# Patient Record
Sex: FEMALE | Race: WHITE | URBAN - METROPOLITAN AREA
[De-identification: names, ages, dates, MRNs, and addresses within clinical notes are randomized per-mention and may not be internally consistent; named-entity substitution may affect disease eponyms.]

---

## 2017-02-14 ENCOUNTER — IMPORTED ENCOUNTER (OUTPATIENT)
Dept: URBAN - METROPOLITAN AREA CLINIC 50 | Facility: CLINIC | Age: 67
End: 2017-02-14

## 2017-09-21 NOTE — PATIENT DISCUSSION
(H25.13) Age-related nuclear cataract, bilateral - Assesment : Examination revealed cataract. Mild. - Plan : Monitor for changes. Advised patient of condition of cataracts. Pt to call our office with decreased vision or increased symptoms. Updated GLRx given today. RTC in 1 year for Exam, sooner if problems or changes occur.

## 2017-09-21 NOTE — PATIENT DISCUSSION
(H55.09) Other forms of nystagmus - Assesment : Examination revealed complaints of mild occasional episodes of nystagmus. Not present at exam today. Pt reports has drastically improved since stopping one of her Parkinson's medications. - Plan : Monitor for changes. Call if worsens.

## 2017-09-26 ENCOUNTER — IMPORTED ENCOUNTER (OUTPATIENT)
Dept: URBAN - METROPOLITAN AREA CLINIC 50 | Facility: CLINIC | Age: 67
End: 2017-09-26

## 2017-11-08 ENCOUNTER — IMPORTED ENCOUNTER (OUTPATIENT)
Dept: URBAN - METROPOLITAN AREA CLINIC 50 | Facility: CLINIC | Age: 67
End: 2017-11-08

## 2017-11-09 ENCOUNTER — IMPORTED ENCOUNTER (OUTPATIENT)
Dept: URBAN - METROPOLITAN AREA CLINIC 50 | Facility: CLINIC | Age: 67
End: 2017-11-09

## 2017-11-13 ENCOUNTER — IMPORTED ENCOUNTER (OUTPATIENT)
Dept: URBAN - METROPOLITAN AREA CLINIC 50 | Facility: CLINIC | Age: 67
End: 2017-11-13

## 2017-12-11 ENCOUNTER — IMPORTED ENCOUNTER (OUTPATIENT)
Dept: URBAN - METROPOLITAN AREA CLINIC 50 | Facility: CLINIC | Age: 67
End: 2017-12-11

## 2018-03-02 ENCOUNTER — IMPORTED ENCOUNTER (OUTPATIENT)
Dept: URBAN - METROPOLITAN AREA CLINIC 50 | Facility: CLINIC | Age: 68
End: 2018-03-02

## 2018-04-10 ENCOUNTER — IMPORTED ENCOUNTER (OUTPATIENT)
Dept: URBAN - METROPOLITAN AREA CLINIC 50 | Facility: CLINIC | Age: 68
End: 2018-04-10

## 2018-04-27 ENCOUNTER — IMPORTED ENCOUNTER (OUTPATIENT)
Dept: URBAN - METROPOLITAN AREA CLINIC 50 | Facility: CLINIC | Age: 68
End: 2018-04-27

## 2018-05-18 ENCOUNTER — IMPORTED ENCOUNTER (OUTPATIENT)
Dept: URBAN - METROPOLITAN AREA CLINIC 50 | Facility: CLINIC | Age: 68
End: 2018-05-18

## 2018-10-19 ENCOUNTER — IMPORTED ENCOUNTER (OUTPATIENT)
Dept: URBAN - METROPOLITAN AREA CLINIC 50 | Facility: CLINIC | Age: 68
End: 2018-10-19

## 2018-10-22 ENCOUNTER — IMPORTED ENCOUNTER (OUTPATIENT)
Dept: URBAN - METROPOLITAN AREA CLINIC 50 | Facility: CLINIC | Age: 68
End: 2018-10-22

## 2018-10-23 ENCOUNTER — IMPORTED ENCOUNTER (OUTPATIENT)
Dept: URBAN - METROPOLITAN AREA CLINIC 50 | Facility: CLINIC | Age: 68
End: 2018-10-23

## 2019-05-09 ENCOUNTER — IMPORTED ENCOUNTER (OUTPATIENT)
Dept: URBAN - METROPOLITAN AREA CLINIC 50 | Facility: CLINIC | Age: 69
End: 2019-05-09

## 2019-05-10 ENCOUNTER — IMPORTED ENCOUNTER (OUTPATIENT)
Dept: URBAN - METROPOLITAN AREA CLINIC 50 | Facility: CLINIC | Age: 69
End: 2019-05-10

## 2019-05-13 ENCOUNTER — IMPORTED ENCOUNTER (OUTPATIENT)
Dept: URBAN - METROPOLITAN AREA CLINIC 50 | Facility: CLINIC | Age: 69
End: 2019-05-13

## 2019-05-21 ENCOUNTER — IMPORTED ENCOUNTER (OUTPATIENT)
Dept: URBAN - METROPOLITAN AREA CLINIC 50 | Facility: CLINIC | Age: 69
End: 2019-05-21

## 2019-05-30 ENCOUNTER — IMPORTED ENCOUNTER (OUTPATIENT)
Dept: URBAN - METROPOLITAN AREA CLINIC 50 | Facility: CLINIC | Age: 69
End: 2019-05-30

## 2019-06-04 ENCOUNTER — IMPORTED ENCOUNTER (OUTPATIENT)
Dept: URBAN - METROPOLITAN AREA CLINIC 50 | Facility: CLINIC | Age: 69
End: 2019-06-04

## 2019-06-13 ENCOUNTER — IMPORTED ENCOUNTER (OUTPATIENT)
Dept: URBAN - METROPOLITAN AREA CLINIC 50 | Facility: CLINIC | Age: 69
End: 2019-06-13

## 2019-07-02 ENCOUNTER — IMPORTED ENCOUNTER (OUTPATIENT)
Dept: URBAN - METROPOLITAN AREA CLINIC 50 | Facility: CLINIC | Age: 69
End: 2019-07-02

## 2019-08-13 NOTE — PATIENT DISCUSSION
(U19.507) Keratoconjunct sicca, not specified as Sjogren's, bilateral - Assesment : Examination revealed Dry Eye Syndrome - Plan : Monitor for changes. Explained KOSTA and increased symptoms with Parkinson's. Recommended Pt to use ATs at least 2-4 times per day and increaase prn. TheraTears coupon given today.

## 2019-08-13 NOTE — PATIENT DISCUSSION
(H25.13) Age-related nuclear cataract, bilateral - Assesment : Examination revealed cataract. Pt reports changes in vision on increased dose of medical marijuana for Parkinson's, Pt now trying to reduce dose. Pt reports no longer having nystagmus now that stopped one of her Parkinson's medications. - Plan : Monitor for changes. Updated GLRx given today. Advised patient of condition of cataracts. Pt to call our office with decreased vision or increased symptoms. RTC in 1 year for Exam, sooner if problems or changes occur.

## 2019-10-04 ENCOUNTER — IMPORTED ENCOUNTER (OUTPATIENT)
Dept: URBAN - METROPOLITAN AREA CLINIC 50 | Facility: CLINIC | Age: 69
End: 2019-10-04

## 2019-10-31 ENCOUNTER — IMPORTED ENCOUNTER (OUTPATIENT)
Dept: URBAN - METROPOLITAN AREA CLINIC 50 | Facility: CLINIC | Age: 69
End: 2019-10-31

## 2019-12-12 ENCOUNTER — IMPORTED ENCOUNTER (OUTPATIENT)
Dept: URBAN - METROPOLITAN AREA CLINIC 50 | Facility: CLINIC | Age: 69
End: 2019-12-12

## 2019-12-13 ENCOUNTER — IMPORTED ENCOUNTER (OUTPATIENT)
Dept: URBAN - METROPOLITAN AREA CLINIC 50 | Facility: CLINIC | Age: 69
End: 2019-12-13

## 2019-12-19 ENCOUNTER — IMPORTED ENCOUNTER (OUTPATIENT)
Dept: URBAN - METROPOLITAN AREA CLINIC 50 | Facility: CLINIC | Age: 69
End: 2019-12-19

## 2020-03-30 ENCOUNTER — IMPORTED ENCOUNTER (OUTPATIENT)
Dept: URBAN - METROPOLITAN AREA CLINIC 50 | Facility: CLINIC | Age: 70
End: 2020-03-30

## 2020-04-30 ENCOUNTER — IMPORTED ENCOUNTER (OUTPATIENT)
Dept: URBAN - METROPOLITAN AREA CLINIC 50 | Facility: CLINIC | Age: 70
End: 2020-04-30

## 2020-10-08 ENCOUNTER — IMPORTED ENCOUNTER (OUTPATIENT)
Dept: URBAN - METROPOLITAN AREA CLINIC 50 | Facility: CLINIC | Age: 70
End: 2020-10-08

## 2020-10-15 ENCOUNTER — IMPORTED ENCOUNTER (OUTPATIENT)
Dept: URBAN - METROPOLITAN AREA CLINIC 50 | Facility: CLINIC | Age: 70
End: 2020-10-15

## 2020-10-30 ENCOUNTER — IMPORTED ENCOUNTER (OUTPATIENT)
Dept: URBAN - METROPOLITAN AREA CLINIC 50 | Facility: CLINIC | Age: 70
End: 2020-10-30

## 2020-11-13 ENCOUNTER — IMPORTED ENCOUNTER (OUTPATIENT)
Dept: URBAN - METROPOLITAN AREA CLINIC 50 | Facility: CLINIC | Age: 70
End: 2020-11-13

## 2020-11-16 ENCOUNTER — IMPORTED ENCOUNTER (OUTPATIENT)
Dept: URBAN - METROPOLITAN AREA CLINIC 50 | Facility: CLINIC | Age: 70
End: 2020-11-16

## 2021-03-11 ENCOUNTER — IMPORTED ENCOUNTER (OUTPATIENT)
Dept: URBAN - METROPOLITAN AREA CLINIC 50 | Facility: CLINIC | Age: 71
End: 2021-03-11

## 2021-04-17 ASSESSMENT — TONOMETRY
OD_IOP_MMHG: 20
OS_IOP_MMHG: 22
OD_IOP_MMHG: 20
OS_IOP_MMHG: 19
OD_IOP_MMHG: 23
OS_IOP_MMHG: 16
OS_IOP_MMHG: 16
OS_IOP_MMHG: 14
OD_IOP_MMHG: 18
OS_IOP_MMHG: 18
OS_IOP_MMHG: 21
OD_IOP_MMHG: 18
OD_IOP_MMHG: 17
OS_IOP_MMHG: 16
OD_IOP_MMHG: 16
OD_IOP_MMHG: 18
OS_IOP_MMHG: 22
OS_IOP_MMHG: 16
OD_IOP_MMHG: 21
OD_IOP_MMHG: 16
OD_IOP_MMHG: 17
OS_IOP_MMHG: 16
OD_IOP_MMHG: 18
OS_IOP_MMHG: 24
OS_IOP_MMHG: 19
OD_IOP_MMHG: 16
OD_IOP_MMHG: 15
OS_IOP_MMHG: 20
OD_IOP_MMHG: 23
OS_IOP_MMHG: 24
OS_IOP_MMHG: 16
OD_IOP_MMHG: 14
OD_IOP_MMHG: 15
OD_IOP_MMHG: 17
OS_IOP_MMHG: 16
OD_IOP_MMHG: 16
OS_IOP_MMHG: 20
OD_IOP_MMHG: 21
OS_IOP_MMHG: 17
OS_IOP_MMHG: 23
OS_IOP_MMHG: 16
OD_IOP_MMHG: 24
OD_IOP_MMHG: 18
OS_IOP_MMHG: 22
OD_IOP_MMHG: 24

## 2021-04-17 ASSESSMENT — VISUAL ACUITY
OS_BAT: 20/20
OS_SC: 20/25-2
OS_CC: 20/30-1
OS_SC: 20/25
OD_CC: 20/30
OS_SC: 20/20
OD_SC: 20/20
OS_CC: 20/20
OD_SC: 20/20
OD_CC: J1+@ 16 IN
OD_SC: 20/20
OD_OTHER: 20/60. 20/80.
OD_BAT: 20/20
OD_SC: 20/20
OS_CC: 20/25
OS_SC: 20/20-
OD_CC: 20/20-
OS_BAT: 20/20
OD_SC: 20/20
OD_BAT: 20/60
OS_CC: 20/30
OS_CC: 20/30+2
OD_CC: 20/25
OD_OTHER: 20/20. 20/25.
OD_BAT: 20/20
OD_CC: J1+
OS_CC: J1+
OD_BAT: 20/60
OD_PH: 20/40+
OS_CC: J1+@ 16 IN
OD_CC: 20/30-+
OD_SC: 20/20
OS_PH: 20/20
OS_CC: 20/100
OD_CC: 20/40
OS_CC: J1+-2
OS_SC: 20/20
OD_CC: 20/40
OS_SC: 20/20-1
OS_OTHER: 20/30. 20/30.
OD_CC: 20/40
OS_SC: 20/25-2+2
OS_BAT: 20/30
OD_OTHER: 20/30. 20/30.
OD_CC: 20/25
OD_SC: 20/20-2
OD_BAT: 20/60
OS_CC: 20/30
OS_CC: J3@ 17 IN
OD_PH: 20/20
OD_OTHER: 20/20. 20/25.
OS_OTHER: 20/20. 20/25.
OD_BAT: 20/30
OS_BAT: 20/70
OD_CC: 20/30-
OD_OTHER: 20/60. 20/80.
OD_CC: J1+
OS_CC: 20/25-
OS_PH: 20/30+
OD_CC: J1+-2
OD_CC: 20/30-1
OD_CC: J3@ 17 IN
OD_SC: 20/20-
OS_CC: J1+
OS_OTHER: 20/20. 20/25.
OS_SC: 20/20
OS_CC: 20/40
OD_SC: 20/60
OD_OTHER: 20/60. 20/80.
OD_SC: 20/20
OS_BAT: 20/70
OS_SC: 20/20-2
OS_OTHER: 20/70. 20/80.
OS_CC: 20/30+
OS_OTHER: 20/70. 20/70.

## 2021-04-17 ASSESSMENT — PACHYMETRY
OS_CT_UM: 549
OD_CT_UM: 554
OD_CT_UM: 554
OS_CT_UM: 549
OD_CT_UM: 554
OS_CT_UM: 549
OD_CT_UM: 554
OS_CT_UM: 549
OS_CT_UM: 549
OD_CT_UM: 554
OS_CT_UM: 549
OD_CT_UM: 554
OS_CT_UM: 549
OD_CT_UM: 554
OS_CT_UM: 549
OD_CT_UM: 554
OD_CT_UM: 554
OS_CT_UM: 549
OS_CT_UM: 549
OD_CT_UM: 554
OS_CT_UM: 549
OD_CT_UM: 554

## 2021-07-15 ENCOUNTER — PREPPED CHART (OUTPATIENT)
Dept: URBAN - METROPOLITAN AREA CLINIC 53 | Facility: CLINIC | Age: 71
End: 2021-07-15

## 2021-07-20 ENCOUNTER — DIAGNOSTICS - HVF (OUTPATIENT)
Dept: URBAN - METROPOLITAN AREA CLINIC 53 | Facility: CLINIC | Age: 71
End: 2021-07-20

## 2021-07-20 DIAGNOSIS — H40.1131: ICD-10-CM

## 2021-07-20 PROCEDURE — 92083 EXTENDED VISUAL FIELD XM: CPT

## 2021-07-22 ENCOUNTER — DIAGNOSTIC TESTING ONLY (OUTPATIENT)
Dept: URBAN - METROPOLITAN AREA CLINIC 48 | Facility: CLINIC | Age: 71
End: 2021-07-22

## 2021-07-22 DIAGNOSIS — H47.233: ICD-10-CM

## 2021-07-22 PROCEDURE — 92273 FULL FIELD ERG W/I&R: CPT

## 2021-07-29 ENCOUNTER — ROUTINE EXAM (OUTPATIENT)
Dept: URBAN - METROPOLITAN AREA CLINIC 53 | Facility: CLINIC | Age: 71
End: 2021-07-29

## 2021-07-29 DIAGNOSIS — H40.1112: ICD-10-CM

## 2021-07-29 DIAGNOSIS — H52.12: ICD-10-CM

## 2021-07-29 DIAGNOSIS — H18.453: ICD-10-CM

## 2021-07-29 DIAGNOSIS — H52.4: ICD-10-CM

## 2021-07-29 DIAGNOSIS — H35.371: ICD-10-CM

## 2021-07-29 DIAGNOSIS — H40.1121: ICD-10-CM

## 2021-07-29 DIAGNOSIS — H16.223: ICD-10-CM

## 2021-07-29 PROCEDURE — 92015 DETERMINE REFRACTIVE STATE: CPT

## 2021-07-29 PROCEDURE — 92014 COMPRE OPH EXAM EST PT 1/>: CPT

## 2021-07-29 ASSESSMENT — VISUAL ACUITY
OS_CC: J1+
OS_SC: 20/25
OD_SC: 20/20
OD_CC: J1+

## 2021-07-29 ASSESSMENT — TONOMETRY
OS_IOP_MMHG: 17
OD_IOP_MMHG: 17

## 2021-08-05 ENCOUNTER — SLT OD (OUTPATIENT)
Dept: URBAN - METROPOLITAN AREA CLINIC 53 | Facility: CLINIC | Age: 71
End: 2021-08-05

## 2021-08-05 DIAGNOSIS — H40.1112: ICD-10-CM

## 2021-08-05 PROCEDURE — 65855 TRABECULOPLASTY LASER SURG: CPT

## 2021-08-05 RX ORDER — PREDNISOLONE ACETATE 10 MG/ML
1 SUSPENSION/ DROPS OPHTHALMIC
Start: 2021-08-05

## 2021-08-05 ASSESSMENT — TONOMETRY
OD_IOP_MMHG: 20
OD_IOP_MMHG: 16
OS_IOP_MMHG: 20

## 2021-08-05 ASSESSMENT — VISUAL ACUITY
OS_SC: 20/25+1
OD_SC: 20/25

## 2021-08-13 ENCOUNTER — 1 WEEK FOLLOW-UP (OUTPATIENT)
Dept: URBAN - METROPOLITAN AREA CLINIC 53 | Facility: CLINIC | Age: 71
End: 2021-08-13

## 2021-08-13 DIAGNOSIS — H40.1112: ICD-10-CM

## 2021-08-13 PROCEDURE — 99024 POSTOP FOLLOW-UP VISIT: CPT

## 2021-08-13 ASSESSMENT — TONOMETRY
OD_IOP_MMHG: 20
OS_IOP_MMHG: 20
OD_IOP_MMHG: 19

## 2021-08-13 ASSESSMENT — VISUAL ACUITY
OD_PH: 20/25
OS_SC: 20/30
OS_PH: 20/25
OU_SC: 20/25-1
OD_SC: 20/30-1

## 2021-09-17 ENCOUNTER — 1 MONTH FOLLOW-UP (OUTPATIENT)
Dept: URBAN - METROPOLITAN AREA CLINIC 53 | Facility: CLINIC | Age: 71
End: 2021-09-17

## 2021-09-17 DIAGNOSIS — H40.1112: ICD-10-CM

## 2021-09-17 PROCEDURE — 92012 INTRM OPH EXAM EST PATIENT: CPT

## 2021-09-17 ASSESSMENT — TONOMETRY
OD_IOP_MMHG: 19
OS_IOP_MMHG: 19
OD_IOP_MMHG: 16

## 2021-09-17 ASSESSMENT — VISUAL ACUITY
OS_SC: 20/30
OS_PH: 20/20
OD_SC: 20/20

## 2022-02-18 ENCOUNTER — FOLLOW UP (OUTPATIENT)
Dept: URBAN - METROPOLITAN AREA CLINIC 53 | Facility: CLINIC | Age: 72
End: 2022-02-18

## 2022-02-18 DIAGNOSIS — H40.1112: ICD-10-CM

## 2022-02-18 DIAGNOSIS — H40.1121: ICD-10-CM

## 2022-02-18 DIAGNOSIS — H26.493: ICD-10-CM

## 2022-02-18 DIAGNOSIS — H16.223: ICD-10-CM

## 2022-02-18 PROCEDURE — 92012 INTRM OPH EXAM EST PATIENT: CPT

## 2022-02-18 ASSESSMENT — VISUAL ACUITY
OS_SC: 20/25
OD_SC: 20/25

## 2022-02-18 ASSESSMENT — TONOMETRY
OS_IOP_MMHG: 18
OD_IOP_MMHG: 18

## 2022-03-03 ENCOUNTER — CLINIC PROCEDURE ONLY (OUTPATIENT)
Dept: URBAN - METROPOLITAN AREA CLINIC 53 | Facility: CLINIC | Age: 72
End: 2022-03-03

## 2022-03-03 DIAGNOSIS — H40.1112: ICD-10-CM

## 2022-03-03 PROCEDURE — 66030 INJECTION TREATMENT OF EYE: CPT

## 2022-03-03 RX ORDER — OFLOXACIN 3 MG/ML: 1 SOLUTION OPHTHALMIC

## 2022-03-03 ASSESSMENT — VISUAL ACUITY
OD_SC: 20/20
OS_SC: 20/20

## 2022-03-03 ASSESSMENT — TONOMETRY
OD_IOP_MMHG: 24
OS_IOP_MMHG: 24

## 2022-03-10 ENCOUNTER — CLINIC PROCEDURE ONLY (OUTPATIENT)
Dept: URBAN - METROPOLITAN AREA CLINIC 53 | Facility: CLINIC | Age: 72
End: 2022-03-10

## 2022-03-10 DIAGNOSIS — H40.1112: ICD-10-CM

## 2022-03-10 DIAGNOSIS — H40.1121: ICD-10-CM

## 2022-03-10 PROCEDURE — 66030 INJECTION TREATMENT OF EYE: CPT

## 2022-03-10 PROCEDURE — 92012 INTRM OPH EXAM EST PATIENT: CPT

## 2022-03-10 ASSESSMENT — VISUAL ACUITY
OD_CC: 20/20
OS_CC: 20/20

## 2022-03-10 ASSESSMENT — TONOMETRY
OD_IOP_MMHG: 18
OS_IOP_MMHG: 19

## 2022-04-14 ENCOUNTER — FOLLOW UP (OUTPATIENT)
Dept: URBAN - METROPOLITAN AREA CLINIC 53 | Facility: CLINIC | Age: 72
End: 2022-04-14

## 2022-04-14 DIAGNOSIS — H40.1112: ICD-10-CM

## 2022-04-14 DIAGNOSIS — H40.1121: ICD-10-CM

## 2022-04-14 PROCEDURE — 92012 INTRM OPH EXAM EST PATIENT: CPT

## 2022-04-14 ASSESSMENT — VISUAL ACUITY
OD_SC: 20/20
OS_SC: 20/25

## 2022-04-14 ASSESSMENT — TONOMETRY
OS_IOP_MMHG: 17
OS_IOP_MMHG: 17
OD_IOP_MMHG: 19
OS_IOP_MMHG: 16
OD_IOP_MMHG: 19
OS_IOP_MMHG: 18
OD_IOP_MMHG: 18

## 2022-08-10 ENCOUNTER — DIAGNOSTICS ONLY (OUTPATIENT)
Dept: URBAN - METROPOLITAN AREA CLINIC 53 | Facility: CLINIC | Age: 72
End: 2022-08-10

## 2022-08-10 DIAGNOSIS — H40.1121: ICD-10-CM

## 2022-08-10 DIAGNOSIS — H40.1112: ICD-10-CM

## 2022-08-10 PROCEDURE — 92083 EXTENDED VISUAL FIELD XM: CPT

## 2022-08-10 PROCEDURE — 92133 CPTRZD OPH DX IMG PST SGM ON: CPT

## 2022-08-18 ENCOUNTER — COMPREHENSIVE EXAM (OUTPATIENT)
Dept: URBAN - METROPOLITAN AREA CLINIC 53 | Facility: CLINIC | Age: 72
End: 2022-08-18

## 2022-08-18 DIAGNOSIS — H35.371: ICD-10-CM

## 2022-08-18 DIAGNOSIS — H40.1121: ICD-10-CM

## 2022-08-18 DIAGNOSIS — H40.1112: ICD-10-CM

## 2022-08-18 DIAGNOSIS — H16.223: ICD-10-CM

## 2022-08-18 DIAGNOSIS — H26.493: ICD-10-CM

## 2022-08-18 PROCEDURE — 66821 AFTER CATARACT LASER SURGERY: CPT

## 2022-08-18 PROCEDURE — 92014 COMPRE OPH EXAM EST PT 1/>: CPT

## 2022-08-18 ASSESSMENT — VISUAL ACUITY
OS_SC: 20/25
OD_SC: J10@17"
OS_SC: J10@17"
OD_CC: J1@17"
OD_GLARE: 20/20
OD_GLARE: 20/25
OD_SC: 20/25-2
OS_CC: J1@17"

## 2022-08-18 ASSESSMENT — TONOMETRY
OS_IOP_MMHG: 17
OD_IOP_MMHG: 17
OD_IOP_MMHG: 17
OS_IOP_MMHG: 17

## 2022-09-16 ENCOUNTER — CLINIC PROCEDURE ONLY (OUTPATIENT)
Dept: URBAN - METROPOLITAN AREA CLINIC 53 | Facility: CLINIC | Age: 72
End: 2022-09-16

## 2022-09-16 DIAGNOSIS — H26.492: ICD-10-CM

## 2022-09-16 PROCEDURE — 66821 AFTER CATARACT LASER SURGERY: CPT

## 2022-09-16 ASSESSMENT — TONOMETRY
OS_IOP_MMHG: 17
OD_IOP_MMHG: 17

## 2022-09-16 ASSESSMENT — VISUAL ACUITY
OS_SC: 20/20
OU_SC: 20/20
OD_SC: 20/20

## 2022-10-17 ENCOUNTER — POST-OP (OUTPATIENT)
Dept: URBAN - METROPOLITAN AREA CLINIC 53 | Facility: CLINIC | Age: 72
End: 2022-10-17

## 2022-10-17 DIAGNOSIS — H40.1121: ICD-10-CM

## 2022-10-17 DIAGNOSIS — Z98.890: ICD-10-CM

## 2022-10-17 DIAGNOSIS — H02.834: ICD-10-CM

## 2022-10-17 DIAGNOSIS — H02.831: ICD-10-CM

## 2022-10-17 DIAGNOSIS — H40.1112: ICD-10-CM

## 2022-10-17 PROCEDURE — 99024 POSTOP FOLLOW-UP VISIT: CPT

## 2022-10-17 RX ORDER — BIMATOPROST 0.1 MG/ML
1 SOLUTION/ DROPS OPHTHALMIC EVERY EVENING
Start: 2022-10-17

## 2022-10-17 ASSESSMENT — KERATOMETRY
OS_AXISANGLE_DEGREES: 170
OS_AXISANGLE2_DEGREES: 080
OD_AXISANGLE_DEGREES: 180
OS_K2POWER_DIOPTERS: 43.50
OD_AXISANGLE2_DEGREES: 090
OD_K1POWER_DIOPTERS: 43.50
OD_K2POWER_DIOPTERS: 44.25
OS_K1POWER_DIOPTERS: 41.25

## 2022-10-17 ASSESSMENT — VISUAL ACUITY
OS_SC: 20/20
OU_SC: J2@16"
OD_SC: 20/20
OU_SC: 20/20@24"
OU_SC: 20/20+1

## 2022-10-17 ASSESSMENT — TONOMETRY
OS_IOP_MMHG: 20
OD_IOP_MMHG: 22

## 2022-12-15 ENCOUNTER — FOLLOW UP (OUTPATIENT)
Dept: URBAN - METROPOLITAN AREA CLINIC 53 | Facility: CLINIC | Age: 72
End: 2022-12-15

## 2022-12-15 PROCEDURE — 92012 INTRM OPH EXAM EST PATIENT: CPT

## 2022-12-15 ASSESSMENT — KERATOMETRY
OD_K2POWER_DIOPTERS: 44.25
OS_K1POWER_DIOPTERS: 41.25
OS_AXISANGLE_DEGREES: 170
OD_AXISANGLE2_DEGREES: 090
OD_AXISANGLE_DEGREES: 180
OS_K2POWER_DIOPTERS: 43.50
OD_K1POWER_DIOPTERS: 43.50
OS_AXISANGLE2_DEGREES: 080

## 2022-12-15 ASSESSMENT — TONOMETRY
OS_IOP_MMHG: 17
OD_IOP_MMHG: 17
OD_IOP_MMHG: 17
OS_IOP_MMHG: 17

## 2022-12-15 ASSESSMENT — VISUAL ACUITY
OD_SC: 20/20-1
OS_SC: 20/20-1
OU_SC: 20/20

## 2023-01-12 ENCOUNTER — DIAGNOSTICS ONLY (OUTPATIENT)
Dept: URBAN - METROPOLITAN AREA CLINIC 53 | Facility: CLINIC | Age: 73
End: 2023-01-12

## 2023-01-12 DIAGNOSIS — H40.1112: ICD-10-CM

## 2023-01-12 DIAGNOSIS — H40.1121: ICD-10-CM

## 2023-01-12 PROCEDURE — 92133 CPTRZD OPH DX IMG PST SGM ON: CPT

## 2023-01-12 PROCEDURE — 92083 EXTENDED VISUAL FIELD XM: CPT

## 2023-01-12 ASSESSMENT — KERATOMETRY
OS_AXISANGLE_DEGREES: 170
OD_AXISANGLE2_DEGREES: 090
OS_K1POWER_DIOPTERS: 41.25
OS_K2POWER_DIOPTERS: 43.50
OD_K1POWER_DIOPTERS: 43.50
OS_AXISANGLE2_DEGREES: 080
OD_AXISANGLE_DEGREES: 180
OD_K2POWER_DIOPTERS: 44.25

## 2023-05-18 ENCOUNTER — COMPREHENSIVE EXAM (OUTPATIENT)
Dept: URBAN - METROPOLITAN AREA CLINIC 53 | Facility: CLINIC | Age: 73
End: 2023-05-18

## 2023-05-18 DIAGNOSIS — H16.223: ICD-10-CM

## 2023-05-18 DIAGNOSIS — H40.1121: ICD-10-CM

## 2023-05-18 DIAGNOSIS — H43.812: ICD-10-CM

## 2023-05-18 DIAGNOSIS — H40.1112: ICD-10-CM

## 2023-05-18 DIAGNOSIS — H35.371: ICD-10-CM

## 2023-05-18 PROCEDURE — 92133 CPTRZD OPH DX IMG PST SGM ON: CPT

## 2023-05-18 PROCEDURE — 92014 COMPRE OPH EXAM EST PT 1/>: CPT

## 2023-05-18 ASSESSMENT — KERATOMETRY
OS_AXISANGLE_DEGREES: 170
OS_AXISANGLE2_DEGREES: 080
OD_K2POWER_DIOPTERS: 44.25
OS_K1POWER_DIOPTERS: 41.25
OS_K2POWER_DIOPTERS: 43.50
OD_K1POWER_DIOPTERS: 43.50
OD_AXISANGLE_DEGREES: 180
OD_AXISANGLE2_DEGREES: 090

## 2023-05-18 ASSESSMENT — VISUAL ACUITY
OD_SC: 20/20
OU_SC: J2
OS_SC: 20/20-1

## 2023-05-18 ASSESSMENT — TONOMETRY
OD_IOP_MMHG: 19
OS_IOP_MMHG: 19

## 2023-07-13 ENCOUNTER — CLINIC PROCEDURE ONLY (OUTPATIENT)
Dept: URBAN - METROPOLITAN AREA CLINIC 53 | Facility: CLINIC | Age: 73
End: 2023-07-13

## 2023-07-13 DIAGNOSIS — H40.1112: ICD-10-CM

## 2023-07-13 PROCEDURE — 66030 INJECTION TREATMENT OF EYE: CPT

## 2023-07-13 RX ORDER — MOXIFLOXACIN OPHTHALMIC 5 MG/ML: 1 SOLUTION/ DROPS OPHTHALMIC

## 2023-07-13 ASSESSMENT — KERATOMETRY
OD_AXISANGLE2_DEGREES: 090
OS_AXISANGLE_DEGREES: 170
OS_K1POWER_DIOPTERS: 41.25
OD_K1POWER_DIOPTERS: 43.50
OD_K2POWER_DIOPTERS: 44.25
OD_AXISANGLE_DEGREES: 180
OS_AXISANGLE2_DEGREES: 080
OS_K2POWER_DIOPTERS: 43.50

## 2023-07-13 ASSESSMENT — TONOMETRY
OD_IOP_MMHG: 23
OS_IOP_MMHG: 19

## 2023-07-13 ASSESSMENT — VISUAL ACUITY
OS_SC: 20/20
OD_SC: 20/20

## 2023-07-20 ENCOUNTER — CLINIC PROCEDURE ONLY (OUTPATIENT)
Dept: URBAN - METROPOLITAN AREA CLINIC 53 | Facility: CLINIC | Age: 73
End: 2023-07-20

## 2023-07-20 DIAGNOSIS — H40.1121: ICD-10-CM

## 2023-07-20 PROCEDURE — 66030 INJECTION TREATMENT OF EYE: CPT

## 2023-07-20 ASSESSMENT — KERATOMETRY
OD_K1POWER_DIOPTERS: 43.50
OS_AXISANGLE_DEGREES: 170
OS_K2POWER_DIOPTERS: 43.50
OD_AXISANGLE_DEGREES: 180
OS_K1POWER_DIOPTERS: 41.25
OD_AXISANGLE2_DEGREES: 090
OD_K2POWER_DIOPTERS: 44.25
OS_AXISANGLE2_DEGREES: 080

## 2023-07-20 ASSESSMENT — TONOMETRY
OD_IOP_MMHG: 17
OS_IOP_MMHG: 18
OD_IOP_MMHG: 17
OS_IOP_MMHG: 18

## 2023-07-20 ASSESSMENT — VISUAL ACUITY
OD_SC: 20/20
OS_SC: 20/20-1

## 2023-07-27 ENCOUNTER — FOLLOW UP (OUTPATIENT)
Dept: URBAN - METROPOLITAN AREA CLINIC 53 | Facility: CLINIC | Age: 73
End: 2023-07-27

## 2023-07-27 DIAGNOSIS — H40.1121: ICD-10-CM

## 2023-07-27 DIAGNOSIS — H16.223: ICD-10-CM

## 2023-07-27 DIAGNOSIS — H18.453: ICD-10-CM

## 2023-07-27 DIAGNOSIS — H40.1112: ICD-10-CM

## 2023-07-27 PROCEDURE — 92012 INTRM OPH EXAM EST PATIENT: CPT

## 2023-07-27 ASSESSMENT — VISUAL ACUITY
OD_SC: 20/20-2
OS_SC: 20/20-1

## 2023-07-27 ASSESSMENT — KERATOMETRY
OD_AXISANGLE2_DEGREES: 090
OD_K2POWER_DIOPTERS: 44.25
OD_K1POWER_DIOPTERS: 43.50
OD_AXISANGLE_DEGREES: 180
OS_K1POWER_DIOPTERS: 41.25
OS_AXISANGLE2_DEGREES: 080
OS_K2POWER_DIOPTERS: 43.50
OS_AXISANGLE_DEGREES: 170

## 2023-07-27 ASSESSMENT — TONOMETRY
OS_IOP_MMHG: 17
OS_IOP_MMHG: 17
OD_IOP_MMHG: 18
OD_IOP_MMHG: 18

## 2023-11-30 ENCOUNTER — FOLLOW UP (OUTPATIENT)
Dept: URBAN - METROPOLITAN AREA CLINIC 53 | Facility: CLINIC | Age: 73
End: 2023-11-30

## 2023-11-30 DIAGNOSIS — H40.1112: ICD-10-CM

## 2023-11-30 DIAGNOSIS — H40.1121: ICD-10-CM

## 2023-11-30 PROCEDURE — 99213 OFFICE O/P EST LOW 20 MIN: CPT

## 2023-11-30 PROCEDURE — 92133 CPTRZD OPH DX IMG PST SGM ON: CPT

## 2023-11-30 ASSESSMENT — TONOMETRY
OD_IOP_MMHG: 19
OS_IOP_MMHG: 19
OD_IOP_MMHG: 19
OS_IOP_MMHG: 19

## 2023-11-30 ASSESSMENT — KERATOMETRY
OD_AXISANGLE_DEGREES: 180
OS_K1POWER_DIOPTERS: 41.25
OS_AXISANGLE_DEGREES: 170
OD_K2POWER_DIOPTERS: 44.25
OD_K1POWER_DIOPTERS: 43.50
OS_K2POWER_DIOPTERS: 43.50
OS_AXISANGLE2_DEGREES: 080
OD_AXISANGLE2_DEGREES: 090

## 2023-11-30 ASSESSMENT — VISUAL ACUITY
OS_SC: 20/20-1
OD_SC: 20/20-1
OU_SC: 20/20